# Patient Record
Sex: MALE | Race: WHITE | NOT HISPANIC OR LATINO | Employment: FULL TIME | URBAN - METROPOLITAN AREA
[De-identification: names, ages, dates, MRNs, and addresses within clinical notes are randomized per-mention and may not be internally consistent; named-entity substitution may affect disease eponyms.]

---

## 2021-11-11 ENCOUNTER — TRANSFERRED RECORDS (OUTPATIENT)
Dept: HEALTH INFORMATION MANAGEMENT | Facility: CLINIC | Age: 46
End: 2021-11-11

## 2021-11-12 ENCOUNTER — TELEPHONE (OUTPATIENT)
Dept: OPHTHALMOLOGY | Facility: CLINIC | Age: 46
End: 2021-11-12

## 2021-11-12 ENCOUNTER — TRANSCRIBE ORDERS (OUTPATIENT)
Dept: OTHER | Age: 46
End: 2021-11-12

## 2021-11-12 DIAGNOSIS — T26.12XD: Primary | ICD-10-CM

## 2021-11-12 NOTE — TELEPHONE ENCOUNTER
Forwarding this to Cornea Team to review.     Pt has an appt with Dr. Kaye for 11/30.     Keri Valdovinos Communication Facilitator on 11/12/2021 at 10:44 AM

## 2021-11-12 NOTE — TELEPHONE ENCOUNTER
[facility] Call Center    Phone Message    May a detailed message be left on voicemail: yes     Reason for Call: Appointment Intake    Referring Provider Name: Burn of left cornea, subsequent encounter  Diagnosis and/or Symptoms: [facility]  P: [phone]  F: [phone]    My Clinical Question Is:   Dr. Richey - left corneal burn        Referring provider requesting 11/15 or 11/16 with Dr. Richey. No availability, scheduled patient with Dr. Kaye on 11/30. Routing to clinic per referral protocols to assist if sooner appointment can be had.    Action Taken: Message routed to:  [facility] [[id number]]    Travel Screening: Not Applicable           Will be a Workman's Comp appointment. Has not received anything yet from them.

## 2021-11-12 NOTE — TELEPHONE ENCOUNTER
Called / LVM for Blake to confirm his appt with Dr. Kaye for 11/30. That time / date is ok with Cornea Team.   He does not need am earlier.     Keri Valdovinos Communication Facilitator on 11/12/2021 at 2:52 PM

## 2021-11-15 ENCOUNTER — TELEPHONE (OUTPATIENT)
Dept: OPHTHALMOLOGY | Facility: CLINIC | Age: 46
End: 2021-11-15

## 2021-11-15 NOTE — TELEPHONE ENCOUNTER
They would like this patient to be seen for the corneal burn by a resident this week.  Dr. Ramirez is seeing the patient tomorrow.  I told Essie that it appears cornea team has reviewed records and said it was okay for the patient to wait for the appt on 11/30/21 but the appt was also rescheduled to 11/18/21 this week with Dr. GALEANA.      I relayed the new appt time to Essie.  She will call back with any further questions.    Saida Pena, MARQUEZ 2:10 PM 11/15/2021

## 2021-11-16 ENCOUNTER — TRANSFERRED RECORDS (OUTPATIENT)
Dept: HEALTH INFORMATION MANAGEMENT | Facility: CLINIC | Age: 46
End: 2021-11-16

## 2021-11-18 ENCOUNTER — OFFICE VISIT (OUTPATIENT)
Dept: OPHTHALMOLOGY | Facility: CLINIC | Age: 46
End: 2021-11-18
Attending: OPHTHALMOLOGY
Payer: OTHER MISCELLANEOUS

## 2021-11-18 DIAGNOSIS — T26.12XD: Primary | ICD-10-CM

## 2021-11-18 DIAGNOSIS — H18.20 INFILTRATE OF LEFT CORNEA: ICD-10-CM

## 2021-11-18 PROCEDURE — 87102 FUNGUS ISOLATION CULTURE: CPT | Performed by: OPHTHALMOLOGY

## 2021-11-18 PROCEDURE — 99207 PR NO BILLABLE SERVICE THIS VISIT: CPT | Mod: LT | Performed by: OPHTHALMOLOGY

## 2021-11-18 PROCEDURE — 65430 CORNEAL SMEAR: CPT | Performed by: OPHTHALMOLOGY

## 2021-11-18 PROCEDURE — 87075 CULTR BACTERIA EXCEPT BLOOD: CPT | Performed by: OPHTHALMOLOGY

## 2021-11-18 PROCEDURE — V2790 AMNIOTIC MEMBRANE: HCPCS

## 2021-11-18 PROCEDURE — 99204 OFFICE O/P NEW MOD 45 MIN: CPT | Mod: 25 | Performed by: OPHTHALMOLOGY

## 2021-11-18 PROCEDURE — 65778 COVER EYE W/MEMBRANE: CPT | Performed by: OPHTHALMOLOGY

## 2021-11-18 PROCEDURE — 87070 CULTURE OTHR SPECIMN AEROBIC: CPT | Performed by: OPHTHALMOLOGY

## 2021-11-18 PROCEDURE — G0463 HOSPITAL OUTPT CLINIC VISIT: HCPCS | Mod: 25

## 2021-11-18 PROCEDURE — 65778 COVER EYE W/MEMBRANE: CPT | Mod: LT | Performed by: OPHTHALMOLOGY

## 2021-11-18 RX ORDER — PSEUDOEPHED/ACETAMINOPH/DIPHEN 30MG-500MG
TABLET ORAL
COMMUNITY
Start: 2021-11-10

## 2021-11-18 RX ORDER — ASPIRIN 81 MG/1
81 TABLET, CHEWABLE ORAL
COMMUNITY
Start: 2021-11-10 | End: 2021-12-14

## 2021-11-18 RX ORDER — MOXIFLOXACIN 5 MG/ML
1 SOLUTION/ DROPS OPHTHALMIC
Qty: 3 ML | Refills: 1 | Status: SHIPPED | OUTPATIENT
Start: 2021-11-18 | End: 2021-12-14

## 2021-11-18 RX ORDER — ERYTHROMYCIN 5 MG/G
1 OINTMENT OPHTHALMIC
COMMUNITY
Start: 2021-11-09 | End: 2021-11-23

## 2021-11-18 RX ORDER — PREDNISOLONE ACETATE 10 MG/ML
1 SUSPENSION/ DROPS OPHTHALMIC 4 TIMES DAILY
Qty: 5 ML | Refills: 0 | Status: SHIPPED | OUTPATIENT
Start: 2021-11-18 | End: 2021-11-30

## 2021-11-18 RX ORDER — MOXIFLOXACIN 5 MG/ML
SOLUTION/ DROPS OPHTHALMIC
COMMUNITY
Start: 2021-11-09 | End: 2021-11-26 | Stop reason: DRUGHIGH

## 2021-11-18 RX ORDER — HYDROCODONE BITARTRATE AND ACETAMINOPHEN 5; 325 MG/1; MG/1
TABLET ORAL
COMMUNITY
Start: 2021-08-02 | End: 2021-11-23

## 2021-11-18 RX ORDER — IBUPROFEN 800 MG/1
TABLET, FILM COATED ORAL
COMMUNITY
Start: 2021-08-02 | End: 2021-11-23

## 2021-11-18 ASSESSMENT — SLIT LAMP EXAM - LIDS
COMMENTS: NORMAL
COMMENTS: NORMAL

## 2021-11-18 ASSESSMENT — TONOMETRY
OD_IOP_MMHG: 14
IOP_METHOD: ICARE
OS_IOP_MMHG: 11

## 2021-11-18 ASSESSMENT — VISUAL ACUITY
OD_SC+: -2
OS_SC: 20/30
METHOD: SNELLEN - LINEAR
OD_SC: 20/20

## 2021-11-18 ASSESSMENT — EXTERNAL EXAM - LEFT EYE: OS_EXAM: NORMAL

## 2021-11-18 ASSESSMENT — CONF VISUAL FIELD
OS_NORMAL: 1
OD_NORMAL: 1
METHOD: COUNTING FINGERS

## 2021-11-18 ASSESSMENT — EXTERNAL EXAM - RIGHT EYE: OD_EXAM: NORMAL

## 2021-11-18 NOTE — PROGRESS NOTES
"CC: Alkali burn OS    Referring provider: MD Ashley ([facility])    HPI:  Blake Hernandez 46 year old male presents to cornea clinic referred by above for evaluation of alkali burn left eye.    The patient reports that he was at work using chemicals when he accidentally got concentrated sodium hydroxide in his left eye, mouth and on his left arm approximately 30 minutes prior to arrival. He states that he spit out the chemicals from his mouth and flushed his eye for 10 minutes but is still experiencing pain, redness and itching to the eye. Happened while wearing safety glasses from underneath.     In the ED, they flushed his eye with 4L of NS. Was seen by Dr. Ramirez 4 times since incident. She placed a Prokera on the first day which fell out two days later. Medicated ointment then placed and patched for 3 days. Last seen Dr. Ramirez 2 days ago who he states noted an \"infection\" in the inferior cornea. Now on EES mariah QID and moxi every hour while awake. No cx were taken.     Still feeling itchy, blurry visoin, and sensitivity to light.  No hx of eye problems.    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  And " macario Mcclendon MD  PGY-3 Resident Physician  Department of Ophthalmology      Attending Physician Attestation:  Complete documentation of historical and exam elements from today's encounter can be found in the full encounter summary report (not reduplicated in this progress note).  I personally obtained the chief complaint(s) and history of present illness.  I confirmed and edited as necessary the review of systems, past medical/surgical history, family history, social history, and examination findings as documented by others; and I examined the patient myself.  I personally reviewed the relevant tests, images, and reports as documented above.  I formulated and edited as necessary the assessment and plan and discussed the findings and management plan with the patient and family. - Adiel Kaye MD

## 2021-11-18 NOTE — LETTER
"11/18/2021       RE: Blake Hernandez  [address]  [place] [address]     Dear Colleague,    Thank you for referring your patient, Blake Hernandez, to the [facility] at [facility]. Please see a copy of my visit note below.    CC: Alkali burn OS    Referring provider: MD Ashley ([facility] [place])    HPI:  Blake Hernandez 46 year old male presents to cornea clinic referred by above for evaluation of alkali burn left eye.    The patient reports that he was at work using chemicals when he accidentally got concentrated sodium hydroxide in his left eye, mouth and on his left arm approximately 30 minutes prior to arrival. He states that he spit out the chemicals from his mouth and flushed his eye for 10 minutes but is still experiencing pain, redness and itching to the eye. Happened while wearing safety glasses from underneath.     In the ED, they flushed his eye with 4L of NS. Was seen by Dr. Ramirez 4 times since incident. She placed a Prokera on the first day which fell out two days later. Medicated ointment then placed and patched for 3 days. Last seen Dr. Ramirez 2 days ago who he states noted an \"infection\" in the inferior cornea. Now on EES mariah QID and moxi every hour while awake. No cx were taken.     Still feeling itchy, blurry visoin, and sensitivity to light.  No hx of eye problems.    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  And tuesday      Again, thank you for allowing me to participate in the care of your patient.      Sincerely,    Adiel Kaye MD

## 2021-11-18 NOTE — NURSING NOTE
Chief Complaints and History of Present Illnesses   Patient presents with     Corneal Evaluation     Corneal burn LE     Chief Complaint(s) and History of Present Illness(es)     Corneal Evaluation     Comments: Corneal burn LE              Comments     Pt states that he got Sodium Hydroxide into his LE 11/09/2021. Pt rinsed out LE and was also see in the ED to rinse out his LE.  Vision fluctuates daily in LE, pt very light sensitive. Redness in LE, not improving.   LE itchy since injury. Pt currently on Moxifloxicin gtts Every hour LE and erythromycin ointment QID LE.    LUPE Justice November 18, 2021 7:55 AM

## 2021-11-19 NOTE — PROGRESS NOTES
"CC: Alkali burn OS    Referring provider: MD Ashley ([facility])    HPI:  Blake Hernandez 46 year old male presents to cornea clinic referred by above for evaluation of alkali burn left eye.    The patient reports that he was at work using chemicals when he accidentally got concentrated sodium hydroxide in his left eye, mouth and on his left arm approximately 30 minutes prior to arrival. He states that he spit out the chemicals from his mouth and flushed his eye for 10 minutes but is still experiencing pain, redness and itching to the eye. Happened while wearing safety glasses from underneath.     In the ED, they flushed his eye with 4L of NS. Was seen by Dr. Ramirez 4 times since incident. She placed a Prokera on the first day which fell out two days later. Medicated ointment then placed and patched for 3 days. Last seen Dr. Ramirez 2 days ago who he states noted an \"infection\" in the inferior cornea. Now on EES mariah QID and moxi every hour while awake. No cx were taken.     Still feeling itchy, blurry visoin, and sensitivity to light.  No hx of eye problems.    - 11/18/2021: seen by Dr. Mcclendon and Dr. Kaye who diagnosed him with DLK. They placed Prokera and started PF QID, Vigamox q2hrs.     Interval history - 11/19/2021:  Denies eye pain, tearing. Just irritated with the prokera in. Patching to help with light sensitivity which has continued to decrease. Overall all his symptoms continue to diminish.    Vigamox q2hrs is a huge inconvenience but he says he is doing it with alarms and it's better than q1hr.     POHx:  Myopic LASIK each eye at age 20 (1997s-2000)  Glasses: no  CTL wearer: no    Family hx of eye disease: father with hx of RD  Social Hx: works as a    PMH: Asthma    Current Meds:  - continue PF QID  - continue moxifloxacin to every 2 hours around the clock  - Vitamin C 1000 mg every day  - PFATs every 2 hours     Assessment:  # Alkali burn, left eye: healing  - Onset 11/9/21 from " NaOH at work, even while wearing safety glasses  - S/p Prokera for 2 days 11/9-11/11, replacement 11/18-present  - Cornea and conj healing 11/20/2021 with interval improvement in defect/infiltrate area and lessening edema since 11/18  - IOP good, low (8) due to measuring with Prokera  # 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 Jimmy Lloyd.     Follow up: 11/23/2021 as scheduled with Dr. Kaye    We had the pleasure of being able to serve you today. Please reach out to us if you have any further questions.    My privilege to be part of your care,  Lauro Edouard MD, MSc  Ophthalmology PGY-2 resident physician  Pager: [phone]    Attending Physician Attestation:  Complete documentation of historical and exam elements from today's encounter can be found in the full encounter summary report (not reduplicated in this progress note).  I personally obtained the chief complaint(s) and history of present illness.  I confirmed and edited as necessary the review of systems, past medical/surgical history, family history, social history, and examination findings as documented by others; and I examined the patient myself.  I personally reviewed the relevant tests, images, and reports as documented above.  I formulated and edited as necessary the assessment and plan and discussed the findings and management plan with the patient and family. - Adiel Kaye MD

## 2021-11-20 ENCOUNTER — OFFICE VISIT (OUTPATIENT)
Dept: OPHTHALMOLOGY | Facility: CLINIC | Age: 46
End: 2021-11-20
Payer: OTHER MISCELLANEOUS

## 2021-11-20 DIAGNOSIS — T26.12XD: Primary | ICD-10-CM

## 2021-11-20 DIAGNOSIS — H18.20 INFILTRATE OF LEFT CORNEA: ICD-10-CM

## 2021-11-20 PROCEDURE — 99203 OFFICE O/P NEW LOW 30 MIN: CPT | Mod: GC | Performed by: STUDENT IN AN ORGANIZED HEALTH CARE EDUCATION/TRAINING PROGRAM

## 2021-11-20 ASSESSMENT — VISUAL ACUITY
METHOD: SNELLEN - LINEAR
OS_PH_SC: 20/80
OS_SC: 20/100
OD_SC+: -1
OD_SC: 20/20

## 2021-11-20 ASSESSMENT — SLIT LAMP EXAM - LIDS: COMMENTS: NORMAL

## 2021-11-20 ASSESSMENT — EXTERNAL EXAM - LEFT EYE: OS_EXAM: NORMAL

## 2021-11-20 ASSESSMENT — TONOMETRY
OS_IOP_MMHG: 8
OD_IOP_MMHG: 13

## 2021-11-20 ASSESSMENT — EXTERNAL EXAM - RIGHT EYE: OD_EXAM: NORMAL

## 2021-11-23 ENCOUNTER — OFFICE VISIT (OUTPATIENT)
Dept: OPHTHALMOLOGY | Facility: CLINIC | Age: 46
End: 2021-11-23
Attending: OPHTHALMOLOGY
Payer: OTHER MISCELLANEOUS

## 2021-11-23 DIAGNOSIS — T26.12XD: Primary | ICD-10-CM

## 2021-11-23 DIAGNOSIS — H18.20 INFILTRATE OF LEFT CORNEA: ICD-10-CM

## 2021-11-23 PROCEDURE — 99214 OFFICE O/P EST MOD 30 MIN: CPT | Mod: 25 | Performed by: OPHTHALMOLOGY

## 2021-11-23 PROCEDURE — G0463 HOSPITAL OUTPT CLINIC VISIT: HCPCS

## 2021-11-23 PROCEDURE — 92285 EXTERNAL OCULAR PHOTOGRAPHY: CPT | Performed by: OPHTHALMOLOGY

## 2021-11-23 PROCEDURE — V2790 AMNIOTIC MEMBRANE: HCPCS

## 2021-11-23 PROCEDURE — 65778 COVER EYE W/MEMBRANE: CPT | Mod: RT | Performed by: OPHTHALMOLOGY

## 2021-11-23 ASSESSMENT — SLIT LAMP EXAM - LIDS: COMMENTS: NORMAL

## 2021-11-23 ASSESSMENT — TONOMETRY
IOP_METHOD: ICARE
OD_IOP_MMHG: 13
OS_IOP_MMHG: 12

## 2021-11-23 ASSESSMENT — VISUAL ACUITY
OS_SC+: -1
METHOD: SNELLEN - LINEAR
OD_SC+: -2
OD_SC: 20/25
OS_PH_SC: 20/30
OS_SC: 20/70
OS_PH_SC+: +1

## 2021-11-23 ASSESSMENT — CONF VISUAL FIELD
OD_NORMAL: 1
OS_NORMAL: 1

## 2021-11-23 ASSESSMENT — EXTERNAL EXAM - RIGHT EYE: OD_EXAM: NORMAL

## 2021-11-23 ASSESSMENT — EXTERNAL EXAM - LEFT EYE: OS_EXAM: NORMAL

## 2021-11-23 NOTE — NURSING NOTE
"Chief Complaints and History of Present Illnesses   Patient presents with     Follow Up     Burn of left cornea       Chief Complaint(s) and History of Present Illness(es)     Follow Up     Laterality: left eye    Course: stable    Associated symptoms: itching, eye pain, tearing and redness    Treatments tried: eye drops    Pain scale: 2/10    Comments: Burn of left cornea                Comments     He states that since last night, the Prokera ring seems to be breaking up in his left eye.  He has been getting \"goopy stuff\" out of the eye.  The left eye feels scratch and irritated but not painful.      He is using:  - Pred Forte QID left eye   - moxifloxacin to every 2 hours around the clock, left eye   - Vitamin C 1000 mg every day  - PFATs every 2 hours     Deja Hampton, COT 7:53 AM  November 23, 2021                   "

## 2021-11-23 NOTE — PROGRESS NOTES
"CC: Alkali burn OS     Referring provider: MD Ashley ([facility])     HPI:  Blake Hernandez 46 year old male presents to cornea clinic referred by above for evaluation of alkali burn left eye.     The patient reports that he was at work using chemicals when he accidentally got concentrated sodium hydroxide in his left eye, mouth and on his left arm approximately 30 minutes prior to arrival. He states that he spit out the chemicals from his mouth and flushed his eye for 10 minutes but is still experiencing pain, redness and itching to the eye. Happened while wearing safety glasses from underneath.      In the ED, they flushed his eye with 4L of NS. Was seen by Dr. Ramirez 4 times since incident. She placed a Prokera on the first day which fell out two days later. Medicated ointment then placed and patched for 3 days. Last seen Dr. Ramirez 2 days ago who he states noted an \"infection\" in the inferior cornea. Now on EES mariah QID and moxi every hour while awake. No cx were taken.      Still feeling itchy, blurry visoin, and sensitivity to light.  No hx of eye problems.     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  And Tuesday    Adiel Kaye md    Attending Physician Attestation:  Complete documentation of historical and exam elements from today's encounter can be found in the full encounter summary report (not reduplicated in this progress note).  I personally obtained the chief complaint(s) and history of present illness.  I confirmed and edited as necessary the review of systems, past medical/surgical history, family history, social history, and examination findings as documented by others; and I examined the patient myself.  I personally reviewed the relevant tests, images, and reports as documented above.  I formulated and edited as necessary the assessment and plan and discussed the findings and management plan with the patient and family. - Adiel Kaye MD

## 2021-11-25 LAB
BACTERIA WND CULT: NO GROWTH
BACTERIA WND CULT: NORMAL

## 2021-11-26 ENCOUNTER — OFFICE VISIT (OUTPATIENT)
Dept: OPHTHALMOLOGY | Facility: CLINIC | Age: 46
End: 2021-11-26
Attending: OPHTHALMOLOGY
Payer: OTHER MISCELLANEOUS

## 2021-11-26 DIAGNOSIS — T26.12XD: Primary | ICD-10-CM

## 2021-11-26 PROCEDURE — G0463 HOSPITAL OUTPT CLINIC VISIT: HCPCS

## 2021-11-26 PROCEDURE — 92012 INTRM OPH EXAM EST PATIENT: CPT | Performed by: OPHTHALMOLOGY

## 2021-11-26 ASSESSMENT — VISUAL ACUITY
METHOD: SNELLEN - LINEAR
OD_SC+: -3
OS_PH_SC: 20/80
OS_SC: 20/150
OD_SC: 20/25

## 2021-11-26 ASSESSMENT — EXTERNAL EXAM - LEFT EYE: OS_EXAM: NORMAL

## 2021-11-26 ASSESSMENT — EXTERNAL EXAM - RIGHT EYE: OD_EXAM: NORMAL

## 2021-11-26 ASSESSMENT — TONOMETRY
IOP_METHOD: ICARE
OS_IOP_MMHG: 11
OD_IOP_MMHG: 13

## 2021-11-26 ASSESSMENT — CONF VISUAL FIELD
METHOD: COUNTING FINGERS
OD_NORMAL: 1
OS_NORMAL: 1

## 2021-11-26 ASSESSMENT — SLIT LAMP EXAM - LIDS: COMMENTS: NORMAL

## 2021-11-26 NOTE — PROGRESS NOTES
"CC: Alkali burn OS     Referring provider: MD Ashley ([facility])     HPI:  Blake Hernandez 46 year old male presents to cornea clinic referred by above for evaluation of alkali burn left eye.     The patient reports that he was at work on Nov 9th, 2021 using chemicals when he accidentally got concentrated sodium hydroxide in his left eye, mouth and on his left arm approximately 30 minutes prior to arrival. He states that he spit out the chemicals from his mouth and flushed his eye for 10 minutes but is still experiencing pain, redness and itching to the eye. Happened while wearing safety glasses from underneath.      In the ED, they flushed his eye with 4L of NS. Was seen by Dr. Ramirez 4 times since incident. She placed a Prokera on the first day which fell out two days later. Medicated ointment then placed and patched for 3 days. Last seen Dr. Ramirez 2 days ago who he states noted an \"infection\" in the inferior cornea. Now on EES mariah QID and moxi every hour while awake. No cx were taken.      Interval hx: Pt notes that the membrane is still present.  The Prokera ring is uncomfortable but bearable.   No hx of eye problems.    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 Kaye, [place], likely remove Prokera ring.    Attending Physician Attestation:  Complete documentation of historical and exam elements from today's encounter can be found in the full encounter summary report (not reduplicated in this progress note).  I personally obtained the chief complaint(s) and history of present illness.  I confirmed and edited as necessary the review of systems, past medical/surgical history, family history, social history, and examination findings as documented by others; and I examined the patient myself.  I formulated and edited as necessary the assessment and plan and discussed the findings and management plan with the patient and family.     Jimmy Lloyd, DO  Cornea Fellow    Addendum:  Pt returned to clinic as his vision significantly improved, making him think his Prokera had dissolved. Upon exam, it had dissolved so the ring was removed.  Staining showed that there was no longer any epi defect so I didn't replace the ring.  He will keep all drops the same and return to clinic as scheduled next week.

## 2021-11-26 NOTE — NURSING NOTE
Chief Complaints and History of Present Illnesses   Patient presents with     Follow Up     3 day follow up Alkali burn, left eye     Chief Complaint(s) and History of Present Illness(es)     Follow Up     Comments: 3 day follow up Alkali burn, left eye              Comments     Pt states vision is the same as last visit, blurry LE. No eye pain today.  No flashes or floaters. No new redness or dryness.  Occasional tearing from LE, comes and goes.    LUPE Justice November 26, 2021 9:01 AM

## 2021-11-30 ENCOUNTER — OFFICE VISIT (OUTPATIENT)
Dept: OPHTHALMOLOGY | Facility: CLINIC | Age: 46
End: 2021-11-30
Attending: OPHTHALMOLOGY
Payer: OTHER MISCELLANEOUS

## 2021-11-30 DIAGNOSIS — H18.20 INFILTRATE OF LEFT CORNEA: ICD-10-CM

## 2021-11-30 DIAGNOSIS — T26.12XD: Primary | ICD-10-CM

## 2021-11-30 PROCEDURE — G0463 HOSPITAL OUTPT CLINIC VISIT: HCPCS

## 2021-11-30 PROCEDURE — 99214 OFFICE O/P EST MOD 30 MIN: CPT | Mod: GC | Performed by: OPHTHALMOLOGY

## 2021-11-30 PROCEDURE — 92285 EXTERNAL OCULAR PHOTOGRAPHY: CPT | Performed by: OPHTHALMOLOGY

## 2021-11-30 RX ORDER — PREDNISOLONE ACETATE 10 MG/ML
1 SUSPENSION/ DROPS OPHTHALMIC 4 TIMES DAILY
Qty: 10 ML | Refills: 4 | Status: SHIPPED | OUTPATIENT
Start: 2021-11-30 | End: 2021-12-30

## 2021-11-30 ASSESSMENT — CONF VISUAL FIELD
METHOD: COUNTING FINGERS
OS_NORMAL: 1
OD_NORMAL: 1

## 2021-11-30 ASSESSMENT — VISUAL ACUITY
OS_SC: 20/40
OD_SC: 20/25
METHOD: SNELLEN - LINEAR
OD_SC+: -3
OS_PH_SC: 20/30
OS_PH_SC+: +2

## 2021-11-30 ASSESSMENT — TONOMETRY
OS_IOP_MMHG: 12
OD_IOP_MMHG: 16
IOP_METHOD: ICARE

## 2021-11-30 ASSESSMENT — SLIT LAMP EXAM - LIDS: COMMENTS: NORMAL

## 2021-11-30 ASSESSMENT — EXTERNAL EXAM - RIGHT EYE: OD_EXAM: NORMAL

## 2021-11-30 ASSESSMENT — EXTERNAL EXAM - LEFT EYE: OS_EXAM: NORMAL

## 2021-11-30 NOTE — NURSING NOTE
Chief Complaints and History of Present Illnesses   Patient presents with     Follow Up     4 day follow up Inferonasal K ulcer vs LASIK flap related DLK, left eye     Chief Complaint(s) and History of Present Illness(es)     Follow Up     Comments: 4 day follow up Inferonasal K ulcer vs LASIK flap related DLK, left eye              Comments     Pt states vision has improved since last visit, but still blurry.   Still having some redness in LE, improving slowly.  No dryness. No discharge.    LUPE Justice November 30, 2021 7:39 AM

## 2021-11-30 NOTE — PROGRESS NOTES
"CC: Alkali burn OS     Referring provider: MD Ashley ([facility])     HPI:  Blake Hernandez 46 year old male presents to cornea clinic referred by above for evaluation of alkali burn left eye.     The patient reports that he was at work on Nov 9th, 2021 using chemicals when he accidentally got concentrated sodium hydroxide in his left eye, mouth and on his left arm approximately 30 minutes prior to arrival. He states that he spit out the chemicals from his mouth and flushed his eye for 10 minutes but is still experiencing pain, redness and itching to the eye. Happened while wearing safety glasses from underneath.      In the ED, they flushed his eye with 4L of NS. Was seen by Dr. Ramirez 4 times since incident. She placed a Prokera on the first day which fell out two days later. Medicated ointment then placed and patched for 3 days. Last seen Dr. Ramirez 2 days ago who he states noted an \"infection\" in the inferior cornea. Now on EES mariah QID and moxi every hour while awake. No cx were taken.      Interval hx: Pt notes his left eye is feeling better, not totally normal but improved.  Denies any flashes, floaters, or tearing.      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    Jimmy Lloyd,   Fellow, Cornea & External Disease  Department of Ophthalmology  [facility]      Attending Physician Attestation:  Complete documentation of historical and exam elements from today's encounter can be found in the full encounter summary report (not reduplicated in this progress note).  I personally obtained the chief complaint(s) and history of present illness.  I confirmed and edited as necessary the review of systems, past medical/surgical history, family history, social history, and examination findings as documented by others; and I examined the patient myself.  I personally reviewed the relevant tests, images, and reports as documented above.  I formulated and edited as necessary the assessment and plan and discussed the findings and management plan with the patient and family. - Adiel Kaye MD

## 2021-12-14 ENCOUNTER — OFFICE VISIT (OUTPATIENT)
Dept: OPHTHALMOLOGY | Facility: CLINIC | Age: 46
End: 2021-12-14
Attending: OPHTHALMOLOGY
Payer: OTHER MISCELLANEOUS

## 2021-12-14 DIAGNOSIS — T26.12XD: Primary | ICD-10-CM

## 2021-12-14 DIAGNOSIS — H17.9 LEFT CORNEAL SCAR: ICD-10-CM

## 2021-12-14 PROCEDURE — G0463 HOSPITAL OUTPT CLINIC VISIT: HCPCS

## 2021-12-14 PROCEDURE — 99213 OFFICE O/P EST LOW 20 MIN: CPT | Mod: GC | Performed by: OPHTHALMOLOGY

## 2021-12-14 ASSESSMENT — CONF VISUAL FIELD
OD_NORMAL: 1
OS_NORMAL: 1
METHOD: COUNTING FINGERS

## 2021-12-14 ASSESSMENT — VISUAL ACUITY
OD_SC: 20/20
OD_SC+: -2
OS_SC+: -2
OS_SC: 20/30
OS_PH_SC: 20/20
OS_PH_SC+: -2
METHOD: SNELLEN - LINEAR

## 2021-12-14 ASSESSMENT — SLIT LAMP EXAM - LIDS: COMMENTS: NORMAL

## 2021-12-14 ASSESSMENT — EXTERNAL EXAM - LEFT EYE: OS_EXAM: NORMAL

## 2021-12-14 ASSESSMENT — TONOMETRY
IOP_METHOD: TONOPEN
OS_IOP_MMHG: 13
OD_IOP_MMHG: 12

## 2021-12-14 ASSESSMENT — EXTERNAL EXAM - RIGHT EYE: OD_EXAM: NORMAL

## 2021-12-14 NOTE — NURSING NOTE
Chief Complaints and History of Present Illnesses   Patient presents with     Chemical Eye Burn Follow Up     Chief Complaint(s) and History of Present Illness(es)     Chemical Eye Burn Follow Up     Laterality: left eye    Characteristics: constant    Associated symptoms: red eyes (LE) and blurred vision (of and on).  Negative for eye pain, burning and itching    Frequency: constantly    Treatments tried: artificial tears    Response to treatment: mild improvement    Pain scale: 0/10              Comments     Blake is here to follow up on an Alkali burn LE. He feels symptoms are lessening. He feels vision LE has improved since last visit.     Asaf Hyatt COT 8:37 AM December 14, 2021

## 2021-12-14 NOTE — PROGRESS NOTES
"CC: Alkali burn OS     Referring provider: MD Ashley ([facility])     HPI:  Blake Hernandez 46 year old male presents to cornea clinic referred by above for evaluation of alkali burn left eye.     The patient reports that he was at work on Nov 9th, 2021 using chemicals when he accidentally got concentrated sodium hydroxide in his left eye, mouth and on his left arm approximately 30 minutes prior to arrival. He states that he spit out the chemicals from his mouth and flushed his eye for 10 minutes but is still experiencing pain, redness and itching to the eye. Happened while wearing safety glasses from underneath.      In the ED, they flushed his eye with 4L of NS. Was seen by Dr. Ramirez 4 times since incident. She placed a Prokera on the first day which fell out two days later. Medicated ointment then placed and patched for 3 days. Last seen Dr. Ramirez 2 days ago who he states noted an \"infection\" in the inferior cornea. Now on EES mariah QID and moxi every hour while awake. No cx were taken.      Interval hx: Pt notes his left eye is feeling better, a little itchy in the morning but no pain.  Still a little bit of redness but mostly this is resolved.  Denies any flashes, floaters, or tearing.      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  - Vitamin C 1000 mg every " day  - PFATs every 1 hours     Follow up:   In 2 week  With Dr. Kaye/JANETT tovar.    Jimmy Tovar,   Fellow, Cornea & External Disease  Department of Ophthalmology  [facility]      Attending Physician Attestation:  Complete documentation of historical and exam elements from today's encounter can be found in the full encounter summary report (not reduplicated in this progress note).  I personally obtained the chief complaint(s) and history of present illness.  I confirmed and edited as necessary the review of systems, past medical/surgical history, family history, social history, and examination findings as documented by others; and I examined the patient myself.  I personally reviewed the relevant tests, images, and reports as documented above.  I formulated and edited as necessary the assessment and plan and discussed the findings and management plan with the patient and family. - Adiel Kaye MD

## 2021-12-16 LAB — BACTERIA TISS BX CULT: NO GROWTH

## 2021-12-27 ENCOUNTER — OFFICE VISIT (OUTPATIENT)
Dept: OPHTHALMOLOGY | Facility: CLINIC | Age: 46
End: 2021-12-27
Attending: OPHTHALMOLOGY
Payer: OTHER MISCELLANEOUS

## 2021-12-27 DIAGNOSIS — H17.9 LEFT CORNEAL SCAR: ICD-10-CM

## 2021-12-27 DIAGNOSIS — T26.12XD: Primary | ICD-10-CM

## 2021-12-27 PROCEDURE — G0463 HOSPITAL OUTPT CLINIC VISIT: HCPCS

## 2021-12-27 PROCEDURE — 99213 OFFICE O/P EST LOW 20 MIN: CPT | Performed by: OPHTHALMOLOGY

## 2021-12-27 ASSESSMENT — VISUAL ACUITY
METHOD: SNELLEN - LINEAR
OS_SC: 20/25
OD_SC+: +2
OS_SC+: -1
OD_SC: 20/25

## 2021-12-27 ASSESSMENT — SLIT LAMP EXAM - LIDS: COMMENTS: NORMAL

## 2021-12-27 ASSESSMENT — TONOMETRY
IOP_METHOD: ICARE
OD_IOP_MMHG: 12
OS_IOP_MMHG: 17

## 2021-12-27 ASSESSMENT — EXTERNAL EXAM - LEFT EYE: OS_EXAM: NORMAL

## 2021-12-27 ASSESSMENT — EXTERNAL EXAM - RIGHT EYE: OD_EXAM: NORMAL

## 2021-12-27 ASSESSMENT — CONF VISUAL FIELD
OS_NORMAL: 1
METHOD: COUNTING FINGERS
OD_NORMAL: 1

## 2021-12-27 NOTE — PROGRESS NOTES
"CC: Alkali burn OS     Referring provider: MD Ashley ([facility])     HPI:  Blake Hernandez 46 year old male presents to cornea clinic referred by above for evaluation of alkali burn left eye.     The patient reports that he was at work on Nov 9th, 2021 using chemicals when he accidentally got concentrated sodium hydroxide in his left eye, mouth and on his left arm approximately 30 minutes prior to arrival. He states that he spit out the chemicals from his mouth and flushed his eye for 10 minutes but is still experiencing pain, redness and itching to the eye. Happened while wearing safety glasses from underneath.      In the ED, they flushed his eye with 4L of NS. Was seen by Dr. Ramirez 4 times since incident. She placed a Prokera on the first day which fell out two days later. Medicated ointment then placed and patched for 3 days. Last seen Dr. Ramirez 2 days ago who he states noted an \"infection\" in the inferior cornea. Now on EES mariah QID and moxi every hour while awake. No cx were taken.      Interval hx: Pt notes his left eye is feeling normal now.  Still a little bit of redness but mostly this is resolved.  Denies any flashes, floaters, or tearing.      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  - Vitamin C 1000 mg every day  - PFATs every 1 hours     Follow up: "   In 2 week  With Dr. Kaye/JANETT tovar.    Attending Physician Attestation:  Complete documentation of historical and exam elements from today's encounter can be found in the full encounter summary report (not reduplicated in this progress note).  I personally obtained the chief complaint(s) and history of present illness.  I confirmed and edited as necessary the review of systems, past medical/surgical history, family history, social history, and examination findings as documented by others; and I examined the patient myself.  I formulated and edited as necessary the assessment and plan and discussed the findings and management plan with the patient and family.     Jimmy Tovar, DO  Cornea Fellow

## 2021-12-27 NOTE — NURSING NOTE
Chief Complaints and History of Present Illnesses   Patient presents with     Follow Up     Burn of left cornea     Chief Complaint(s) and History of Present Illness(es)     Follow Up     Laterality: left eye    Course: stable    Associated symptoms: eye pain and redness.  Negative for tearing and headache    Treatments tried: eye drops and artificial tears    Pain scale: 0/10    Comments: Burn of left cornea              Comments     Using :  Pred forte TID left eye   PFATs Q1     Niya Gil COT 12:19 PM December 27, 2021

## 2022-01-11 ENCOUNTER — OFFICE VISIT (OUTPATIENT)
Dept: OPHTHALMOLOGY | Facility: CLINIC | Age: 47
End: 2022-01-11
Attending: OPHTHALMOLOGY
Payer: OTHER MISCELLANEOUS

## 2022-01-11 DIAGNOSIS — T26.12XD: Primary | ICD-10-CM

## 2022-01-11 DIAGNOSIS — H17.9 LEFT CORNEAL SCAR: ICD-10-CM

## 2022-01-11 PROCEDURE — 99214 OFFICE O/P EST MOD 30 MIN: CPT | Mod: GC | Performed by: OPHTHALMOLOGY

## 2022-01-11 PROCEDURE — 92285 EXTERNAL OCULAR PHOTOGRAPHY: CPT | Performed by: OPHTHALMOLOGY

## 2022-01-11 PROCEDURE — G0463 HOSPITAL OUTPT CLINIC VISIT: HCPCS

## 2022-01-11 RX ORDER — PREDNISOLONE ACETATE 10 MG/ML
1-2 SUSPENSION/ DROPS OPHTHALMIC 2 TIMES DAILY
Qty: 10 ML | Refills: 3 | Status: SHIPPED | OUTPATIENT
Start: 2022-01-11 | End: 2022-04-11

## 2022-01-11 ASSESSMENT — CONF VISUAL FIELD
OD_NORMAL: 1
OS_NORMAL: 1
METHOD: COUNTING FINGERS

## 2022-01-11 ASSESSMENT — VISUAL ACUITY
METHOD: SNELLEN - LINEAR
OS_SC: 20/25
OD_SC: 20/25
OD_SC+: -2

## 2022-01-11 ASSESSMENT — TONOMETRY
IOP_METHOD: ICARE
OD_IOP_MMHG: 14
OS_IOP_MMHG: 14

## 2022-01-11 ASSESSMENT — EXTERNAL EXAM - LEFT EYE: OS_EXAM: NORMAL

## 2022-01-11 ASSESSMENT — EXTERNAL EXAM - RIGHT EYE: OD_EXAM: NORMAL

## 2022-01-11 ASSESSMENT — SLIT LAMP EXAM - LIDS: COMMENTS: NORMAL

## 2022-01-11 NOTE — PROGRESS NOTES
"CC: Alkali burn OS     Referring provider: MD Ashley ([facility])     HPI:  Blake Hernandez 46 year old male presents to cornea clinic referred by above for evaluation of alkali burn left eye.     The patient reports that he was at work on Nov 9th, 2021 using chemicals when he accidentally got concentrated sodium hydroxide in his left eye, mouth approximately 30 minutes prior to arrival. He states that he spit out the chemicals from his mouth and flushed his eye for 10 minutes but is still experiencing pain, redness and itching to the eye. Happened while wearing safety glasses from underneath.      In the ED, they flushed his eye with 4L of NS. Was seen by Dr. Ramirez 4 times since incident. She placed a Prokera on the first day which fell out two days later. Medicated ointment then placed and patched for 3 days. Last seen Dr. Ramirez 2 days ago who he states noted an \"infection\" in the inferior cornea. Now on EES mariah QID and moxi every hour while awake. No cx were taken.      Interval hx 01/11/22:  Last visit was 12/27 - 2 weeks ago. He reports that the eye is doing a lot better. Still have some redness. No more itchiness. Denies any floaters or floaters, or tearing.     POHx:  Myopic LASIK each eye at age 20 (1997s-2000)  NaOH burn to the left eye (11/9/21)    Glasses: no  CTL wearer: no     Family hx of eye disease: father with hx of RD  Social Hx: works as a    PMH: Asthma     Current Meds:  Pred forte TID left eye   PFATs Q1 while awake (Refresh ? 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 Kaye/JANETT tovar.    Vasile Bertrand MD - PGY3  Department of Ophthalmology  Pager: [phone]    Attending Physician Attestation:  Complete documentation of historical and exam elements from today's encounter can be found in the full encounter summary report (not reduplicated in this progress note).  I personally obtained the chief complaint(s) and history of present illness.  I confirmed and edited as necessary the review of systems, past medical/surgical history, family history, social history, and examination findings as documented by others; and I examined the patient myself.  I personally reviewed the relevant tests, images, and reports as documented above.  I formulated and edited as necessary the assessment and plan and discussed the findings and management plan with the patient and family. - Adiel Kaye MD

## 2022-01-11 NOTE — NURSING NOTE
Chief Complaints and History of Present Illnesses   Patient presents with     Follow Up     Chief Complaint(s) and History of Present Illness(es)     Follow Up     Laterality: left eye    Onset: sudden    Severity: moderate    Associated symptoms: Negative for dryness, itching, floaters and flashes    Response to treatment: mild improvement    Pain scale: 0/10              Comments     Blake is here to follow up on a Burn of left cornea. (November 9 2021)  He says LE feels better than last visit, and less tearing as well.     Asaf Hyatt COT 8:43 AM January 11, 2022

## 2022-02-08 ENCOUNTER — OFFICE VISIT (OUTPATIENT)
Dept: OPHTHALMOLOGY | Facility: CLINIC | Age: 47
End: 2022-02-08
Attending: OPHTHALMOLOGY
Payer: OTHER MISCELLANEOUS

## 2022-02-08 DIAGNOSIS — T26.12XD: ICD-10-CM

## 2022-02-08 DIAGNOSIS — H17.9 LEFT CORNEAL SCAR: ICD-10-CM

## 2022-02-08 DIAGNOSIS — T26.12XD: Primary | ICD-10-CM

## 2022-02-08 PROCEDURE — 99213 OFFICE O/P EST LOW 20 MIN: CPT | Mod: GC | Performed by: OPHTHALMOLOGY

## 2022-02-08 PROCEDURE — G0463 HOSPITAL OUTPT CLINIC VISIT: HCPCS

## 2022-02-08 PROCEDURE — 92285 EXTERNAL OCULAR PHOTOGRAPHY: CPT | Performed by: OPHTHALMOLOGY

## 2022-02-08 ASSESSMENT — CONF VISUAL FIELD
OS_NORMAL: 1
OD_NORMAL: 1

## 2022-02-08 ASSESSMENT — VISUAL ACUITY
METHOD: SNELLEN - LINEAR
OD_SC: 20/25
OS_PH_SC: 20/20
OS_SC: 20/30
OD_SC+: -2

## 2022-02-08 ASSESSMENT — SLIT LAMP EXAM - LIDS: COMMENTS: NORMAL

## 2022-02-08 ASSESSMENT — TONOMETRY
IOP_METHOD: ICARE
OS_IOP_MMHG: 14
OD_IOP_MMHG: 15

## 2022-02-08 ASSESSMENT — EXTERNAL EXAM - LEFT EYE: OS_EXAM: NORMAL

## 2022-02-08 ASSESSMENT — EXTERNAL EXAM - RIGHT EYE: OD_EXAM: NORMAL

## 2022-02-08 NOTE — PROGRESS NOTES
"CC: Alkali burn OS     Referring provider: MD Ashley ([facility])     HPI:  Blake Hernandez 46 year old male presents to cornea clinic referred by above for evaluation of alkali burn left eye.     The patient reports that he was at work on Nov 9th, 2021 using chemicals when he accidentally got concentrated sodium hydroxide in his left eye, mouth approximately 30 minutes prior to arrival. He states that he spit out the chemicals from his mouth and flushed his eye for 10 minutes but is still experiencing pain, redness and itching to the eye. Happened while wearing safety glasses from underneath.      In the ED, they flushed his eye with 4L of NS. Was seen by Dr. Ramirez 4 times since incident. She placed a Prokera on the first day which fell out two days later. Medicated ointment then placed and patched for 3 days. Last seen Dr. Ramirez 2 days ago who he states noted an \"infection\" in the inferior cornea. Now on EES mariah QID and moxi every hour while awake. No cx were taken.      Interval hx 02.8/22:  Last visit was 1/11/22. He reports that the eye is doing a lot better. Still has some redness. Denies any pain.  No more itchiness. Denies any floaters or floaters, or tearing.     POHx:  Myopic LASIK each eye at age 20 (1997s-2000)  NaOH burn to the left eye (11/9/21)    Glasses: no  CTL wearer: no     Family hx of eye disease: father with hx of RD  Social Hx: works as a    PMH: Asthma     Current Meds:  Pred forte BID left eye   PFATs Q1 while awake (Refresh ? 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 Kaye/JANETT tovar. - call sooner with any problems    Jimmy Tovar DO  Fellow, Cornea & External Disease  Department of Ophthalmology  [facility]      Attending Physician Attestation:  Complete documentation of historical and exam elements from today's encounter can be found in the full encounter summary report (not reduplicated in this progress note).  I personally obtained the chief complaint(s) and history of present illness.  I confirmed and edited as necessary the review of systems, past medical/surgical history, family history, social history, and examination findings as documented by others; and I examined the patient myself.  I personally reviewed the relevant tests, images, and reports as documented above.  I formulated and edited as necessary the assessment and plan and discussed the findings and management plan with the patient and family. - Adiel Kaye MD

## 2022-02-08 NOTE — NURSING NOTE
Chief Complaints and History of Present Illnesses   Patient presents with     Follow Up     Chief Complaint(s) and History of Present Illness(es)     Follow Up     Laterality: left eye    Onset: 1 month ago              Comments     Pt. States that he is doing well. No change in VA BE. No pain BE. No flashes or floaters BE.   Siena Rico COT 8:28 AM February 8, 2022

## 2022-03-22 ENCOUNTER — OFFICE VISIT (OUTPATIENT)
Dept: OPHTHALMOLOGY | Facility: CLINIC | Age: 47
End: 2022-03-22
Attending: OPHTHALMOLOGY
Payer: OTHER MISCELLANEOUS

## 2022-03-22 DIAGNOSIS — H17.9 LEFT CORNEAL SCAR: ICD-10-CM

## 2022-03-22 DIAGNOSIS — H18.30 CORNEAL EPITHELIAL DEFECT: Primary | ICD-10-CM

## 2022-03-22 PROCEDURE — G0463 HOSPITAL OUTPT CLINIC VISIT: HCPCS

## 2022-03-22 PROCEDURE — 99213 OFFICE O/P EST LOW 20 MIN: CPT | Mod: GC | Performed by: OPHTHALMOLOGY

## 2022-03-22 RX ORDER — OFLOXACIN 3 MG/ML
1-2 SOLUTION/ DROPS OPHTHALMIC 4 TIMES DAILY
Qty: 5 ML | Refills: 1 | Status: SHIPPED | OUTPATIENT
Start: 2022-03-22 | End: 2022-04-21

## 2022-03-22 ASSESSMENT — VISUAL ACUITY
METHOD: SNELLEN - LINEAR
OS_SC: 20/25
OD_SC: 20/25

## 2022-03-22 ASSESSMENT — EXTERNAL EXAM - RIGHT EYE: OD_EXAM: NORMAL

## 2022-03-22 ASSESSMENT — EXTERNAL EXAM - LEFT EYE: OS_EXAM: NORMAL

## 2022-03-22 ASSESSMENT — TONOMETRY
OD_IOP_MMHG: 15
IOP_METHOD: ICARE
OS_IOP_MMHG: 14

## 2022-03-22 ASSESSMENT — CONF VISUAL FIELD
OS_NORMAL: 1
OD_NORMAL: 1

## 2022-03-22 ASSESSMENT — SLIT LAMP EXAM - LIDS: COMMENTS: NORMAL

## 2022-03-22 NOTE — NURSING NOTE
Chief Complaints and History of Present Illnesses   Patient presents with     Chemical Eye Burn Follow Up     Chief Complaint(s) and History of Present Illness(es)     Chemical Eye Burn Follow Up     Laterality: left eye              Comments     Pt. States that he has had more itching LE the last month. No change in VA BE. No pain BE.  Siena Rico COT 8:05 AM March 22, 2022

## 2022-03-22 NOTE — PROGRESS NOTES
"CC: Alkali burn OS     Referring provider: MD Ashley ([facility])     HPI:  Blake Hernandez 46 year old male presents to cornea clinic referred by above for evaluation of alkali burn left eye.     The patient reports that he was at work on Nov 9th, 2021 using chemicals when he accidentally got concentrated sodium hydroxide in his left eye, mouth approximately 30 minutes prior to arrival. He states that he spit out the chemicals from his mouth and flushed his eye for 10 minutes but is still experiencing pain, redness and itching to the eye. Happened while wearing safety glasses from underneath.      In the ED, they flushed his eye with 4L of NS. Was seen by Dr. Ramirez 4 times since incident. She placed a Prokera on the first day which fell out two days later. Medicated ointment then placed and patched for 3 days. Last seen Dr. Ramirez 2 days ago who he states noted an \"infection\" in the inferior cornea. Now on EES mraiah QID and moxi every hour while awake. No cx were taken.      Interval hx 03/22/22:  Last visit was 2/8/22. He reports that the eye is doing well.  No big changes.  He has felt a little more irritated left eye in the last day or two.  Still has some redness. Denies any pain.  No more itchiness. Denies any floaters or floaters, or tearing.     POHx:  Myopic LASIK each eye at age 20 (1997s-2000)  NaOH burn to the left eye (11/9/21)    Glasses: no  CTL wearer: no     Family hx of eye disease: father with hx of RD  Social Hx: works as a    PMH: Asthma     Current Meds:  Pred forte 1 time daily left eye   PFATs Q1 while awake (Refresh ? 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  - Consider if not healed punctal plug LLL  - Add ofloxacin prophylactic 4 times daily    Follow up:   In 1 weeks with Dr. Kaye/JANETT tovar. - call sooner with any problems    Jimmy Tovar DO  Fellow, Cornea & External Disease  Department of Ophthalmology  [facility]    Attending Physician Attestation:  Complete documentation of historical and exam elements from today's encounter can be found in the full encounter summary report (not reduplicated in this progress note).  I personally obtained the chief complaint(s) and history of present illness.  I confirmed and edited as necessary the review of systems, past medical/surgical history, family history, social history, and examination findings as documented by others; and I examined the patient myself.  I personally reviewed the relevant tests, images, and reports as documented above.  I formulated and edited as necessary the assessment and plan and discussed the findings and management plan with the patient and family. - Adiel Kaye MD

## 2022-03-29 ENCOUNTER — OFFICE VISIT (OUTPATIENT)
Dept: OPHTHALMOLOGY | Facility: CLINIC | Age: 47
End: 2022-03-29
Attending: OPHTHALMOLOGY
Payer: OTHER MISCELLANEOUS

## 2022-03-29 DIAGNOSIS — T54.3X1D ALKALINE CHEMICAL BURN OF LEFT CORNEA AND CONJUNCTIVAL SAC, SUBSEQUENT ENCOUNTER: ICD-10-CM

## 2022-03-29 DIAGNOSIS — T26.62XD ALKALINE CHEMICAL BURN OF LEFT CORNEA AND CONJUNCTIVAL SAC, SUBSEQUENT ENCOUNTER: ICD-10-CM

## 2022-03-29 DIAGNOSIS — H17.9 LEFT CORNEAL SCAR: Primary | ICD-10-CM

## 2022-03-29 PROCEDURE — 99213 OFFICE O/P EST LOW 20 MIN: CPT | Mod: GC | Performed by: OPHTHALMOLOGY

## 2022-03-29 PROCEDURE — G0463 HOSPITAL OUTPT CLINIC VISIT: HCPCS

## 2022-03-29 RX ORDER — TRAZODONE HYDROCHLORIDE 50 MG/1
1 TABLET, FILM COATED ORAL
COMMUNITY
Start: 2020-08-24 | End: 2022-03-29

## 2022-03-29 RX ORDER — ONDANSETRON 4 MG/1
4 TABLET, ORALLY DISINTEGRATING ORAL
COMMUNITY
Start: 2021-11-10 | End: 2022-03-29

## 2022-03-29 RX ORDER — DOXYCYCLINE 100 MG/1
CAPSULE ORAL
COMMUNITY
Start: 2021-06-17 | End: 2022-03-29

## 2022-03-29 RX ORDER — DOCUSATE SODIUM 100 MG/1
CAPSULE, LIQUID FILLED ORAL
COMMUNITY
Start: 2021-11-10 | End: 2022-03-29

## 2022-03-29 RX ORDER — ALBUTEROL SULFATE 90 UG/1
AEROSOL, METERED RESPIRATORY (INHALATION)
COMMUNITY
Start: 2022-02-07 | End: 2022-03-29

## 2022-03-29 RX ORDER — AMOXICILLIN 500 MG/1
CAPSULE ORAL
COMMUNITY
Start: 2021-08-02 | End: 2022-03-29

## 2022-03-29 RX ORDER — ALBUTEROL SULFATE 90 UG/1
AEROSOL, METERED RESPIRATORY (INHALATION)
COMMUNITY

## 2022-03-29 RX ORDER — CHLORHEXIDINE GLUCONATE ORAL RINSE 1.2 MG/ML
SOLUTION DENTAL
COMMUNITY
Start: 2021-08-02 | End: 2022-03-29

## 2022-03-29 RX ORDER — CYCLOBENZAPRINE HCL 10 MG
TABLET ORAL
COMMUNITY
Start: 2022-03-03 | End: 2022-03-29

## 2022-03-29 RX ORDER — AMOXICILLIN 875 MG
TABLET ORAL
COMMUNITY
Start: 2021-09-11 | End: 2022-03-29

## 2022-03-29 ASSESSMENT — VISUAL ACUITY
OD_PH_SC: 20/20
OD_PH_SC+: -2
OD_SC: 20/25
METHOD: SNELLEN - LINEAR
OD_SC+: -1
OS_SC+: +1
OS_PH_SC+: -2
OS_PH_SC: 20/20
OS_SC: 20/40

## 2022-03-29 ASSESSMENT — CONF VISUAL FIELD
OS_NORMAL: 1
METHOD: COUNTING FINGERS
OD_NORMAL: 1

## 2022-03-29 ASSESSMENT — EXTERNAL EXAM - RIGHT EYE: OD_EXAM: NORMAL

## 2022-03-29 ASSESSMENT — TONOMETRY
OD_IOP_MMHG: 17
OS_IOP_MMHG: 16
IOP_METHOD: ICARE

## 2022-03-29 ASSESSMENT — EXTERNAL EXAM - LEFT EYE: OS_EXAM: NORMAL

## 2022-03-29 ASSESSMENT — SLIT LAMP EXAM - LIDS: COMMENTS: NORMAL

## 2022-03-29 NOTE — NURSING NOTE
Chief Complaints and History of Present Illnesses   Patient presents with     Follow Up     Follow up for epi defect of LE       Chief Complaint(s) and History of Present Illness(es)     Follow Up     Laterality: left eye    Frequency: constantly    Timing: throughout the day    Associated symptoms: Negative for eye pain, discharge, swelling, burning, itching and tearing    Pain scale: 0/10    Comments: Follow up for epi defect of LE                Comments     Pt states he has no pain or VA changes.  Prednisolone daily to LE  Oflox four times a day to LE.  Vit C 1000 mg daily  PFAT every half hour  MARQUEZ Corrigan COT 7:18 AM 03/29/2022

## 2022-03-29 NOTE — PROGRESS NOTES
"CC: Alkali burn OS     Referring provider: MD Ashley ([facility])     HPI:  Blake Hernandez 46 year old male presents to cornea clinic referred by above for evaluation of alkali burn left eye.     The patient reports that he was at work on Nov 9th, 2021 using chemicals when he accidentally got concentrated sodium hydroxide in his left eye, mouth approximately 30 minutes prior to arrival. He states that he spit out the chemicals from his mouth and flushed his eye for 10 minutes but is still experiencing pain, redness and itching to the eye. Happened while wearing safety glasses from underneath.      In the ED, they flushed his eye with 4L of NS. Was seen by Dr. Ramirez 4 times since incident. She placed a Prokera on the first day which fell out two days later. Medicated ointment then placed and patched for 3 days. Last seen Dr. Ramirez 2 days ago who he states noted an \"infection\" in the inferior cornea. Now on EES mariah QID and moxi every hour while awake. No cx were taken.      Interval hx 03/29/22:  Last visit was 3/22/22.  He is here to follow up on a scratch left eye.  He reports that the eye is feeling better, less red.   Denies any pain.  No more itchiness. Denies any floaters or floaters, or tearing.     POHx:  Myopic LASIK each eye at age 20 (1997s-2000)  NaOH burn to the left eye (11/9/21)    Glasses: no  CTL wearer: no     Family hx of eye disease: father with hx of RD  Social Hx: works as a    PMH: Asthma     Current Meds:  Pred forte 1 time daily left eye   PFATs Q1 while awake (Refresh ? 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  - OK to stop ofloxacin   - Consider if not there is recurrence punctal plug LLL    Follow up:   In 1 month with Dr. Kaye/JANETT tovar. - call sooner with any problems    Jimmy Tovar DO  Fellow, Cornea & External Disease  Department of Ophthalmology  [facility]    Attending Physician Attestation:  Complete documentation of historical and exam elements from today's encounter can be found in the full encounter summary report (not reduplicated in this progress note).  I personally obtained the chief complaint(s) and history of present illness.  I confirmed and edited as necessary the review of systems, past medical/surgical history, family history, social history, and examination findings as documented by others; and I examined the patient myself.  I personally reviewed the relevant tests, images, and reports as documented above.  I formulated and edited as necessary the assessment and plan and discussed the findings and management plan with the patient and family. - Adiel Kaye MD

## 2022-05-19 ENCOUNTER — OFFICE VISIT (OUTPATIENT)
Dept: OPHTHALMOLOGY | Facility: CLINIC | Age: 47
End: 2022-05-19
Attending: OPHTHALMOLOGY
Payer: OTHER MISCELLANEOUS

## 2022-05-19 DIAGNOSIS — T54.3X1D ALKALINE CHEMICAL BURN OF LEFT CORNEA AND CONJUNCTIVAL SAC, SUBSEQUENT ENCOUNTER: Primary | ICD-10-CM

## 2022-05-19 DIAGNOSIS — H17.9 LEFT CORNEAL SCAR: ICD-10-CM

## 2022-05-19 DIAGNOSIS — T26.62XD ALKALINE CHEMICAL BURN OF LEFT CORNEA AND CONJUNCTIVAL SAC, SUBSEQUENT ENCOUNTER: Primary | ICD-10-CM

## 2022-05-19 PROCEDURE — G0463 HOSPITAL OUTPT CLINIC VISIT: HCPCS

## 2022-05-19 PROCEDURE — 99213 OFFICE O/P EST LOW 20 MIN: CPT | Performed by: OPHTHALMOLOGY

## 2022-05-19 RX ORDER — PREDNISOLONE ACETATE 10 MG/ML
1-2 SUSPENSION/ DROPS OPHTHALMIC EVERY OTHER DAY
Qty: 10 ML | Refills: 3 | Status: SHIPPED | OUTPATIENT
Start: 2022-05-19

## 2022-05-19 ASSESSMENT — CONF VISUAL FIELD
OD_NORMAL: 1
OS_NORMAL: 1
METHOD: COUNTING FINGERS

## 2022-05-19 ASSESSMENT — VISUAL ACUITY
OS_SC: 20/30
OS_PH_SC+: -1
OD_SC+: -2
OD_SC: 20/25
OS_PH_SC: 20/20
METHOD: SNELLEN - LINEAR
OS_SC+: -1

## 2022-05-19 ASSESSMENT — TONOMETRY
OD_IOP_MMHG: 13
IOP_METHOD: ICARE
OS_IOP_MMHG: 15

## 2022-05-19 ASSESSMENT — SLIT LAMP EXAM - LIDS: COMMENTS: NORMAL

## 2022-05-19 ASSESSMENT — EXTERNAL EXAM - LEFT EYE: OS_EXAM: NORMAL

## 2022-05-19 ASSESSMENT — EXTERNAL EXAM - RIGHT EYE: OD_EXAM: NORMAL

## 2022-05-19 NOTE — NURSING NOTE
Chief Complaints and History of Present Illnesses   Patient presents with     corneal scar follow up     Chief Complaint(s) and History of Present Illness(es)     corneal scar follow up               Comments     Left corneal scar follow up. He feels there is no change since last visit. LE still becomes red off and on throughout the day.LE feels dry off and on  Denies eye pain, flashes or floaters.     Asaf Hyatt COT 8:43 AM May 19, 2022
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
